# Patient Record
Sex: MALE | Race: WHITE | NOT HISPANIC OR LATINO
[De-identification: names, ages, dates, MRNs, and addresses within clinical notes are randomized per-mention and may not be internally consistent; named-entity substitution may affect disease eponyms.]

---

## 2020-11-20 PROBLEM — Z00.00 ENCOUNTER FOR PREVENTIVE HEALTH EXAMINATION: Status: ACTIVE | Noted: 2020-11-20

## 2020-11-23 ENCOUNTER — APPOINTMENT (OUTPATIENT)
Dept: OTOLARYNGOLOGY | Facility: CLINIC | Age: 50
End: 2020-11-23
Payer: COMMERCIAL

## 2020-11-23 VITALS — TEMPERATURE: 97.8 F | BODY MASS INDEX: 27.4 KG/M2 | HEIGHT: 69 IN | WEIGHT: 185 LBS

## 2020-11-23 DIAGNOSIS — Z78.9 OTHER SPECIFIED HEALTH STATUS: ICD-10-CM

## 2020-11-23 PROCEDURE — 99213 OFFICE O/P EST LOW 20 MIN: CPT | Mod: 25

## 2020-11-23 PROCEDURE — 99072 ADDL SUPL MATRL&STAF TM PHE: CPT

## 2020-11-23 PROCEDURE — 69210 REMOVE IMPACTED EAR WAX UNI: CPT

## 2020-11-23 NOTE — ASSESSMENT
[FreeTextEntry1] : Cerumen impaction was removed bilaterally. He felt better afterwards. His ears are otherwise normal on exam. He has a shallow ulcer on the anterior tongue. He says that he can see periodically.\par \par PLAN\par \par -findings and management options discussed in detail with the patient. \par -good aural hygiene\par -avoid using cotton swabs in the ears\par -wax removal drops as needed. \par -noise precautions\par -He declined an audiogram today\par -follow up in one year if doing well\par -call and return earlier if any concerns. \par

## 2020-11-23 NOTE — HISTORY OF PRESENT ILLNESS
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 He denies otalgia, otorrhea, tinnitus, or dizziness.

## 2021-03-27 ENCOUNTER — APPOINTMENT (OUTPATIENT)
Age: 51
End: 2021-03-27
Payer: COMMERCIAL

## 2021-03-27 PROCEDURE — 0001A: CPT

## 2021-04-17 ENCOUNTER — APPOINTMENT (OUTPATIENT)
Age: 51
End: 2021-04-17
Payer: COMMERCIAL

## 2021-04-17 PROCEDURE — 0002A: CPT

## 2021-10-04 ENCOUNTER — TRANSCRIPTION ENCOUNTER (OUTPATIENT)
Age: 51
End: 2021-10-04

## 2022-03-07 ENCOUNTER — TRANSCRIPTION ENCOUNTER (OUTPATIENT)
Age: 52
End: 2022-03-07

## 2023-03-06 ENCOUNTER — APPOINTMENT (OUTPATIENT)
Dept: OTOLARYNGOLOGY | Facility: CLINIC | Age: 53
End: 2023-03-06
Payer: COMMERCIAL

## 2023-03-06 VITALS — WEIGHT: 185 LBS | HEIGHT: 69 IN | BODY MASS INDEX: 27.4 KG/M2

## 2023-03-06 DIAGNOSIS — H93.293 OTHER ABNORMAL AUDITORY PERCEPTIONS, BILATERAL: ICD-10-CM

## 2023-03-06 DIAGNOSIS — H61.23 IMPACTED CERUMEN, BILATERAL: ICD-10-CM

## 2023-03-06 PROCEDURE — 92557 COMPREHENSIVE HEARING TEST: CPT

## 2023-03-06 PROCEDURE — G0268 REMOVAL OF IMPACTED WAX MD: CPT

## 2023-03-06 PROCEDURE — 99213 OFFICE O/P EST LOW 20 MIN: CPT | Mod: 25

## 2023-03-06 RX ORDER — LITHIUM CARBONATE 150 MG/1
150 CAPSULE ORAL
Qty: 30 | Refills: 0 | Status: DISCONTINUED | COMMUNITY
Start: 2020-09-12 | End: 2023-03-06

## 2023-03-06 RX ORDER — DEXTROAMPHETAMINE SACCHARATE, AMPHETAMINE ASPARTATE, DEXTROAMPHETAMINE SULFATE AND AMPHETAMINE SULFATE 2.5; 2.5; 2.5; 2.5 MG/1; MG/1; MG/1; MG/1
10 TABLET ORAL
Qty: 60 | Refills: 0 | Status: DISCONTINUED | COMMUNITY
Start: 2020-09-12 | End: 2023-03-06

## 2023-03-06 NOTE — HISTORY OF PRESENT ILLNESS
[de-identified] : DELORIS KILGORE is a 52 year old patient with a history of recurrent cerumen impaction here for abnormal perception with ear fullness/pressure.  He has occasional tinnitus but no otalgia, otorrhea, or vertigo.  He has a history of noise exposure.  He uses earplugs when exposed to loud music.  He has no nasal or throat symptoms

## 2023-03-06 NOTE — ASSESSMENT
[FreeTextEntry1] : There was cerumen which was removed.  His ears were otherwise normal on exam.  Audiogram showed normal hearing.\par \par PLAN\par \par -findings and management options discussed in detail with the patient. \par -Good ear hygiene reviewed\par -He will continue to avoid using cotton swabs in the ears\par -Wax removal drops as needed\par -Continue with noise precautions\par -Repeat audiogram as needed\par -If he has eustachian tube dysfunction with nasal congestion and ear pressure, may try nasal steroid spray and/or decongestant\par -Follow-up in 1 year if he is doing well.  He should call and return earlier if he has any concerns or persistent symptoms\par

## 2023-04-10 ENCOUNTER — NON-APPOINTMENT (OUTPATIENT)
Age: 53
End: 2023-04-10

## 2025-07-29 ENCOUNTER — NON-APPOINTMENT (OUTPATIENT)
Age: 55
End: 2025-07-29

## 2025-07-29 ENCOUNTER — APPOINTMENT (OUTPATIENT)
Dept: OTOLARYNGOLOGY | Facility: CLINIC | Age: 55
End: 2025-07-29

## 2025-07-29 ENCOUNTER — APPOINTMENT (OUTPATIENT)
Dept: OTOLARYNGOLOGY | Facility: CLINIC | Age: 55
End: 2025-07-29
Payer: COMMERCIAL

## 2025-07-29 VITALS — BODY MASS INDEX: 27.4 KG/M2 | WEIGHT: 185 LBS | HEIGHT: 69 IN

## 2025-07-29 DIAGNOSIS — H61.23 IMPACTED CERUMEN, BILATERAL: ICD-10-CM

## 2025-07-29 DIAGNOSIS — H93.293 OTHER ABNORMAL AUDITORY PERCEPTIONS, BILATERAL: ICD-10-CM

## 2025-07-29 PROCEDURE — 92567 TYMPANOMETRY: CPT

## 2025-07-29 PROCEDURE — 92557 COMPREHENSIVE HEARING TEST: CPT

## 2025-07-29 PROCEDURE — 99213 OFFICE O/P EST LOW 20 MIN: CPT | Mod: 25

## 2025-07-29 PROCEDURE — G0268 REMOVAL OF IMPACTED WAX MD: CPT
